# Patient Record
Sex: MALE | Race: WHITE | NOT HISPANIC OR LATINO | URBAN - METROPOLITAN AREA
[De-identification: names, ages, dates, MRNs, and addresses within clinical notes are randomized per-mention and may not be internally consistent; named-entity substitution may affect disease eponyms.]

---

## 2020-07-02 ENCOUNTER — EMERGENCY (EMERGENCY)
Facility: HOSPITAL | Age: 25
LOS: 1 days | Discharge: HOME | End: 2020-07-02
Admitting: EMERGENCY MEDICINE
Payer: OTHER MISCELLANEOUS

## 2020-07-02 VITALS
SYSTOLIC BLOOD PRESSURE: 125 MMHG | RESPIRATION RATE: 17 BRPM | DIASTOLIC BLOOD PRESSURE: 70 MMHG | OXYGEN SATURATION: 99 % | TEMPERATURE: 98 F | WEIGHT: 184.97 LBS | HEART RATE: 82 BPM

## 2020-07-02 PROCEDURE — 72125 CT NECK SPINE W/O DYE: CPT | Mod: 26

## 2020-07-02 PROCEDURE — 99053 MED SERV 10PM-8AM 24 HR FAC: CPT

## 2020-07-02 PROCEDURE — 70450 CT HEAD/BRAIN W/O DYE: CPT | Mod: 26

## 2020-07-02 PROCEDURE — 99285 EMERGENCY DEPT VISIT HI MDM: CPT

## 2020-07-02 RX ORDER — CYCLOBENZAPRINE HYDROCHLORIDE 10 MG/1
5 TABLET, FILM COATED ORAL ONCE
Refills: 0 | Status: COMPLETED | OUTPATIENT
Start: 2020-07-02 | End: 2020-07-02

## 2020-07-02 RX ORDER — IBUPROFEN 200 MG
600 TABLET ORAL ONCE
Refills: 0 | Status: COMPLETED | OUTPATIENT
Start: 2020-07-02 | End: 2020-07-02

## 2020-07-02 RX ORDER — ACETAMINOPHEN 500 MG
650 TABLET ORAL ONCE
Refills: 0 | Status: COMPLETED | OUTPATIENT
Start: 2020-07-02 | End: 2020-07-02

## 2020-07-02 RX ADMIN — Medication 600 MILLIGRAM(S): at 22:54

## 2020-07-02 RX ADMIN — CYCLOBENZAPRINE HYDROCHLORIDE 5 MILLIGRAM(S): 10 TABLET, FILM COATED ORAL at 22:54

## 2020-07-02 RX ADMIN — Medication 650 MILLIGRAM(S): at 22:54
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
## 2020-07-02 NOTE — ED ADULT NURSE NOTE - NSIMPLEMENTINTERV_GEN_ALL_ED
Implemented All Universal Safety Interventions:  [place] to call system. Call bell, personal items and telephone within reach. Instruct patient to call for assistance. Room bathroom lighting operational. Non-slip footwear when patient is off stretcher. Physically safe environment: no spills, clutter or unnecessary equipment. Stretcher in lowest position, wheels locked, appropriate side rails in place.

## 2020-07-02 NOTE — ED ADULT NURSE NOTE - CHPI ED NUR SYMPTOMS NEG
no laceration/no neck tenderness/no acting out behaviors/no headache/no loss of consciousness/no bruising/no crying/no decreased eating/drinking/no back pain/no difficulty bearing weight/no disorientation/no fussiness/no sleeping issues

## 2020-07-03 VITALS
SYSTOLIC BLOOD PRESSURE: 107 MMHG | DIASTOLIC BLOOD PRESSURE: 65 MMHG | RESPIRATION RATE: 18 BRPM | HEART RATE: 76 BPM | OXYGEN SATURATION: 99 % | TEMPERATURE: 98 F

## 2020-07-03 NOTE — ED PROVIDER NOTE - PATIENT PORTAL LINK FT
You can access the FollowMyHealth Patient Portal offered by [facility] by registering at the following website: [email or web address]. By joining [email or web address]’s FollowMyHealth portal, you will also be able to view your health information using other applications (apps) compatible with our system.

## 2020-07-03 NOTE — ED PROVIDER NOTE - NSFOLLOWUPINSTRUCTIONS_ED_ALL_ED_FT
Follow up with your primary care doctor or clinics listed below if you do not have a doctor  Return immediately for any new or worsening symptoms or any new concerns      What do I need to know about acute neck pain? Acute neck pain starts suddenly, increases quickly, and goes away in a few days. The pain may come and go, or be worse with certain movements. The pain may be only in your neck, or it may move to your arms, back, or shoulders. You may also have pain that starts in another body area and moves to your neck. Vertebral Column         What causes or increases my risk for acute neck pain? Acute neck pain is often caused by a muscle strain or ligament sprain. Any of the following may cause acute neck pain:     Inflammation of discs in your neck      A condition that affects neck to arm nerves, such as thoracic outlet syndrome or brachial neuritis       A trauma or injury to your neck, such as being hit from behind in a car (whiplash) or sleeping in a bad position      Shingles, or an infection such as meningitis    How is the cause of acute neck pain diagnosed? Your healthcare provider will ask about your symptoms and when they began. Tell him if you were recently in an accident or had another injury to your neck. He will examine your neck and shoulders. He may also have you move your head in certain ways to see if any position causes or relieves the pain.    Blood tests may be used to measure the amount of inflammation or to check for signs of an infection.      X-ray or MRI pictures may show a neck injury or medical condition. Do not enter the MRI room with anything metal. Metal can cause serious damage. Tell the healthcare provider if you have any metal in or on your body.    How is acute neck pain treated? Treatment will depend on what is causing your pain.    Medicines may be prescribed or recommended by your healthcare provider for pain. You may need medicine to treat nerve pain or to stop muscle spasms. Medicines may also be given to reduce inflammation. Your healthcare provider may inject medicine into a nerve to block pain. Over-the-counter NSAID medicine or acetaminophen may be recommended to help treat minor pain or inflammation.      Traction is used to relieve pressure from nerves. Your head is gently pulled up and away from your neck. This stretches muscles and ligaments and makes more room for the spine. Your healthcare provider will tell you the kind of traction that will help your neck pain. Do not use traction devices at home unless directed by your healthcare provider.    What can I do to manage or prevent acute neck pain?     Rest your neck as directed. Do not make sudden movements, such as turning your head quickly. Your healthcare provider may recommend you wear a cervical collar for a short time. The collar will prevent you from moving your head. This will give your neck time to heal if an injury is causing your neck pain. Ask your healthcare provider when you can return to sports or other normal daily activities.      Apply heat as directed. Heat helps relieve pain and swelling. Use a heat wrap, or soak a small towel in warm water. Wring out the extra water. Apply the heat wrap or towel for 20 minutes every hour, or as directed.      Apply ice as directed. Ice helps relieve pain and swelling, and can help prevent tissue damage. Use an ice pack, or put ice in a bag. Cover the ice pack or back with a towel before you apply it to your neck. Apply the ice pack or ice for 15 minutes every hour, or as directed. Your healthcare provider can tell you how often to apply ice.      Do neck exercises as directed. Neck exercises help strengthen the muscles and increase range of motion. Your healthcare provider will tell you which exercises are right for you. He may give you instructions, or he may recommend that you work with a physical therapist. Your healthcare provider or therapist can make sure you are doing the exercises correctly.       Maintain good posture. Try to keep your head and shoulders lifted when you sit. If you work in front of a computer, make sure the monitor is at the right level. You should not need to look up down to see the screen. You should also not have to lean forward to be able to read what is on the screen. Make sure your keyboard, mouse, and other computer items are placed where you do not have to extend your shoulder to reach them. Get up often if you work in front of a computer or sit for long periods of time. Stretch or walk around to keep your neck muscles loose.    When should I seek immediate care?     You have an injury that causes neck pain and shooting pain down your arms or legs.      Your neck pain suddenly becomes severe.      You have neck pain along with numbness, tingling, or weakness in your arms or legs.      You have a stiff neck, a headache, and a fever.    When should I contact my healthcare provider?     You have new or worsening symptoms.      Your symptoms continue even after treatment.      You have questions or concerns about your condition or care.    CARE AGREEMENT:    You have the right to help plan your care. Learn about your health condition and how it may be treated. Discuss treatment options with your healthcare providers to decide what care you want to receive. You always have the right to refuse treatment.
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
## 2020-07-03 NOTE — ED PROVIDER NOTE - PROGRESS NOTE DETAILS
Patient pain well controlled and appears well at discharge. Patient verbalizes understanding of CT results   Given return precautions
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
## 2020-07-06 DIAGNOSIS — M54.2 CERVICALGIA: ICD-10-CM
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
## 2020-07-06 DIAGNOSIS — S13.4XXA SPRAIN OF LIGAMENTS OF CERVICAL SPINE, INITIAL ENCOUNTER: ICD-10-CM

## 2022-10-06 ENCOUNTER — EMERGENCY (EMERGENCY)
Facility: HOSPITAL | Age: 27
LOS: 1 days | Discharge: HOME | End: 2022-10-06
Attending: EMERGENCY MEDICINE | Admitting: EMERGENCY MEDICINE

## 2022-10-06 VITALS
HEART RATE: 80 BPM | SYSTOLIC BLOOD PRESSURE: 118 MMHG | OXYGEN SATURATION: 98 % | TEMPERATURE: 99 F | DIASTOLIC BLOOD PRESSURE: 75 MMHG | RESPIRATION RATE: 16 BRPM

## 2022-10-06 VITALS
HEART RATE: 89 BPM | DIASTOLIC BLOOD PRESSURE: 74 MMHG | SYSTOLIC BLOOD PRESSURE: 133 MMHG | OXYGEN SATURATION: 97 % | TEMPERATURE: 98 F | RESPIRATION RATE: 16 BRPM

## 2022-10-06 PROCEDURE — 70486 CT MAXILLOFACIAL W/O DYE: CPT | Mod: 26

## 2022-10-06 PROCEDURE — 99284 EMERGENCY DEPT VISIT MOD MDM: CPT

## 2022-10-06 RX ORDER — IBUPROFEN 200 MG
600 TABLET ORAL ONCE
Refills: 0 | Status: COMPLETED | OUTPATIENT
Start: 2022-10-06 | End: 2022-10-06

## 2022-10-06 RX ORDER — ACETAMINOPHEN 500 MG
975 TABLET ORAL ONCE
Refills: 0 | Status: COMPLETED | OUTPATIENT
Start: 2022-10-06 | End: 2022-10-06

## 2022-10-06 RX ADMIN — Medication 975 MILLIGRAM(S): at 08:50

## 2022-10-06 RX ADMIN — Medication 600 MILLIGRAM(S): at 08:50
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
## 2022-10-06 NOTE — ED PROVIDER NOTE - PHYSICAL EXAMINATION
VITAL SIGNS: I have reviewed nursing notes and confirm.  CONSTITUTIONAL: Well-developed; well-nourished; in no acute distress.  SKIN: Skin exam is warm and dry, no acute rash.  HEAD: Normocephalic; atraumatic.  EYES: PERRL, EOM intact; conjunctiva and sclera clear.  ENT: No nasal discharge; airway clear.  NECK: Supple; non tender.  CARD: S1, S2 normal; no murmurs, gallops, or rubs. Regular rate and rhythm.  RESP: Unlabored. No wheezes, rales or rhonchi.  ABD: soft; non-distended; non-tender  MSK: Tenderness over the R cheek. No deformities or swelling.   LYMPH: No acute cervical adenopathy.  NEURO: Alert, oriented. Grossly unremarkable.  PSYCH: Cooperative, appropriate.
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
## 2022-10-06 NOTE — ED PROVIDER NOTE - NSFOLLOWUPINSTRUCTIONS_ED_ALL_ED_FT
Musculoskeletal Pain      Musculoskeletal pain refers to aches and pains in your bones, joints, muscles, and the tissues that surround them. This pain can occur in any part of the body. It can last for a short time (acute) or a long time (chronic).    A physical exam, lab tests, and imaging studies may be done to find the cause of your musculoskeletal pain.      Follow these instructions at home:    Lifestyle   •Try to control or lower your stress levels. Stress increases muscle tension and can worsen musculoskeletal pain. It is important to recognize when you are anxious or stressed and learn ways to manage it. This may include:  •Meditation or yoga.      •Cognitive or behavioral therapy.      •Acupuncture or massage therapy.        •You may continue all activities unless the activities cause more pain. When the pain gets better, slowly resume your normal activities. Gradually increase the intensity and duration of your activities or exercise.        Managing pain, stiffness, and swelling                   •Treatment may include medicines for pain and inflammation that are taken by mouth or applied to the skin. Take over-the-counter and prescription medicines only as told by your health care provider.      •When your pain is severe, bed rest may be helpful. Lie or sit in any position that is comfortable, but get out of bed and walk around at least every couple of hours.    •If directed, apply heat to the affected area as often as told by your health care provider. Use the heat source that your health care provider recommends, such as a moist heat pack or a heating pad.  •Place a towel between your skin and the heat source.      •Leave the heat on for 20–30 minutes.      •Remove the heat if your skin turns bright red. This is especially important if you are unable to feel pain, heat, or cold. You may have a greater risk of getting burned.      •If directed, put ice on the painful area. To do this:  •Put ice in a plastic bag.      •Place a towel between your skin and the bag.      •Leave the ice on for 20 minutes, 2–3 times a day.      •Remove the ice if your skin turns bright red. This is very important. If you cannot feel pain, heat, or cold, you have a greater risk of damage to the area.        General instructions     •Your health care provider may recommend that you see a physical therapist. This person can help you come up with a safe exercise program.      •If told by your health care provider, do physical therapy exercises to improve movement and strength in the affected area.      •Keep all follow-up visits. This is important. This includes any physical therapy visits.        Contact a health care provider if:    •Your pain gets worse.      •Medicines do not help ease your pain.      •You cannot use the part of your body that hurts, such as your arm, leg, or neck.      •You have trouble sleeping.      •You have trouble doing your normal activities.        Get help right away if:    •You have a new injury and your pain is worse or different.      •You feel numb or you have tingling in the painful area.        Summary    •Musculoskeletal pain refers to aches and pains in your bones, joints, muscles, and the tissues that surround them.      •This pain can occur in any part of the body.      •Your health care provider may recommend that you see a physical therapist. This person can help you come up with a safe exercise program. Do any exercises as told by your physical therapist.      •Lower your stress level. Stress can worsen musculoskeletal pain. Ways to lower stress may include meditation, yoga, cognitive or behavioral therapy, acupuncture, and massage therapy.
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
## 2022-10-06 NOTE — ED PROVIDER NOTE - PATIENT PORTAL LINK FT
You can access the FollowMyHealth Patient Portal offered by [facility] by registering at the following website: [email or web address]. By joining [employer]s FollowMyHealth portal, you will also be able to view your health information using other applications (apps) compatible with our system.

## 2022-10-08 DIAGNOSIS — S09.93XA UNSPECIFIED INJURY OF FACE, INITIAL ENCOUNTER: ICD-10-CM
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
## 2022-10-08 DIAGNOSIS — R51.9 HEADACHE, UNSPECIFIED: ICD-10-CM
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
## 2024-08-19 NOTE — ED ADULT TRIAGE NOTE - BP NONINVASIVE SYSTOLIC (MM HG)
August 19, 2024      [facility]  [address], [address]  [facility] [address]  Phone: [phone]  Fax: [phone]       Patient: Rock Leon   YOB: 2020  Date of Visit: 08/19/2024    To Whom It May Concern:    Rock Leon  was at [facility] on 08/19/2024. The patient may return to work/school on 8/20/24 with no restrictions. If you have any questions or concerns, or if I can be of further assistance, please do not hesitate to contact me.    Sincerely,          Zoila Baldwin MD     
[phone]